# Patient Record
Sex: FEMALE | Race: WHITE | NOT HISPANIC OR LATINO | Employment: OTHER | URBAN - METROPOLITAN AREA
[De-identification: names, ages, dates, MRNs, and addresses within clinical notes are randomized per-mention and may not be internally consistent; named-entity substitution may affect disease eponyms.]

---

## 2023-11-01 ENCOUNTER — LAB REQUISITION (OUTPATIENT)
Dept: LAB | Facility: HOSPITAL | Age: 73
End: 2023-11-01
Payer: MEDICARE

## 2023-11-01 DIAGNOSIS — R35.0 FREQUENCY OF MICTURITION: ICD-10-CM

## 2023-11-01 PROCEDURE — 87086 URINE CULTURE/COLONY COUNT: CPT

## 2023-11-02 ENCOUNTER — LAB REQUISITION (OUTPATIENT)
Dept: LAB | Facility: HOSPITAL | Age: 73
End: 2023-11-02
Payer: MEDICARE

## 2023-11-02 DIAGNOSIS — R35.0 FREQUENCY OF MICTURITION: ICD-10-CM

## 2023-11-03 ENCOUNTER — OFFICE VISIT (OUTPATIENT)
Dept: PRIMARY CARE | Facility: CLINIC | Age: 73
End: 2023-11-03
Payer: MEDICARE

## 2023-11-03 ENCOUNTER — LAB (OUTPATIENT)
Dept: LAB | Facility: LAB | Age: 73
End: 2023-11-03
Payer: MEDICARE

## 2023-11-03 VITALS
BODY MASS INDEX: 20.7 KG/M2 | SYSTOLIC BLOOD PRESSURE: 111 MMHG | DIASTOLIC BLOOD PRESSURE: 69 MMHG | HEART RATE: 74 BPM | WEIGHT: 109.6 LBS

## 2023-11-03 DIAGNOSIS — R39.15 URINARY URGENCY: ICD-10-CM

## 2023-11-03 DIAGNOSIS — R39.15 URINARY URGENCY: Primary | ICD-10-CM

## 2023-11-03 PROBLEM — E04.1 THYROID NODULE: Status: ACTIVE | Noted: 2023-11-03

## 2023-11-03 PROBLEM — M81.0 AGE-RELATED OSTEOPOROSIS WITHOUT CURRENT PATHOLOGICAL FRACTURE: Status: ACTIVE | Noted: 2021-01-07

## 2023-11-03 PROBLEM — N60.11 BILATERAL FIBROCYSTIC BREAST CHANGES: Status: ACTIVE | Noted: 2018-10-23

## 2023-11-03 PROBLEM — J45.909 HYPERACTIVE AIRWAY DISEASE (HHS-HCC): Status: ACTIVE | Noted: 2023-11-03

## 2023-11-03 PROBLEM — L57.0 ACTINIC KERATOSIS: Status: RESOLVED | Noted: 2018-03-20 | Resolved: 2023-11-03

## 2023-11-03 PROBLEM — R04.0 EPISTAXIS: Status: RESOLVED | Noted: 2023-11-03 | Resolved: 2023-11-03

## 2023-11-03 PROBLEM — B07.9 VIRAL WART, UNSPECIFIED: Status: RESOLVED | Noted: 2018-03-20 | Resolved: 2023-11-03

## 2023-11-03 PROBLEM — N60.12 BILATERAL FIBROCYSTIC BREAST CHANGES: Status: ACTIVE | Noted: 2018-10-23

## 2023-11-03 PROBLEM — E78.5 BORDERLINE HYPERLIPIDEMIA: Status: ACTIVE | Noted: 2023-11-03

## 2023-11-03 PROBLEM — Z85.3 PERSONAL HISTORY OF BREAST CANCER: Status: RESOLVED | Noted: 2018-10-23 | Resolved: 2023-11-03

## 2023-11-03 PROBLEM — M81.0 OSTEOPOROSIS: Status: ACTIVE | Noted: 2023-11-03

## 2023-11-03 PROBLEM — Z17.0: Status: ACTIVE | Noted: 2023-11-03

## 2023-11-03 PROBLEM — C50.211: Status: ACTIVE | Noted: 2023-11-03

## 2023-11-03 PROBLEM — E55.9 VITAMIN D DEFICIENCY: Status: ACTIVE | Noted: 2021-01-07

## 2023-11-03 PROBLEM — L82.1 OTHER SEBORRHEIC KERATOSIS: Status: RESOLVED | Noted: 2018-03-20 | Resolved: 2023-11-03

## 2023-11-03 LAB
APPEARANCE UR: CLEAR
BACTERIA UR CULT: NO GROWTH
BILIRUB UR STRIP.AUTO-MCNC: NEGATIVE MG/DL
COLOR UR: COLORLESS
GLUCOSE UR STRIP.AUTO-MCNC: NEGATIVE MG/DL
KETONES UR STRIP.AUTO-MCNC: NEGATIVE MG/DL
LEUKOCYTE ESTERASE UR QL STRIP.AUTO: NEGATIVE
NITRITE UR QL STRIP.AUTO: NEGATIVE
PH UR STRIP.AUTO: 8 [PH]
PROT UR STRIP.AUTO-MCNC: NEGATIVE MG/DL
RBC # UR STRIP.AUTO: NEGATIVE /UL
SP GR UR STRIP.AUTO: 1
UROBILINOGEN UR STRIP.AUTO-MCNC: <2 MG/DL

## 2023-11-03 PROCEDURE — 1159F MED LIST DOCD IN RCRD: CPT | Performed by: INTERNAL MEDICINE

## 2023-11-03 PROCEDURE — 1160F RVW MEDS BY RX/DR IN RCRD: CPT | Performed by: INTERNAL MEDICINE

## 2023-11-03 PROCEDURE — 1036F TOBACCO NON-USER: CPT | Performed by: INTERNAL MEDICINE

## 2023-11-03 PROCEDURE — 81003 URINALYSIS AUTO W/O SCOPE: CPT

## 2023-11-03 PROCEDURE — 87086 URINE CULTURE/COLONY COUNT: CPT

## 2023-11-03 PROCEDURE — 99213 OFFICE O/P EST LOW 20 MIN: CPT | Performed by: INTERNAL MEDICINE

## 2023-11-03 RX ORDER — GINGER ROOT
POWDER (GRAM) MISCELLANEOUS
COMMUNITY
Start: 2017-12-26

## 2023-11-03 RX ORDER — NITROFURANTOIN 25; 75 MG/1; MG/1
CAPSULE ORAL
COMMUNITY

## 2023-11-03 RX ORDER — TURMERIC 100 %
POWDER (GRAM) MISCELLANEOUS
COMMUNITY
Start: 2017-12-26

## 2023-11-03 RX ORDER — MULTIVITAMIN WITH IRON
TABLET ORAL
COMMUNITY
Start: 2017-12-26

## 2023-11-03 ASSESSMENT — ENCOUNTER SYMPTOMS
DYSURIA: 0
HEADACHES: 0
DIZZINESS: 0
CHILLS: 0
DIFFICULTY URINATING: 0
BACK PAIN: 1
FREQUENCY: 1
ACTIVITY CHANGE: 0
FEVER: 0
FLANK PAIN: 0

## 2023-11-03 NOTE — PATIENT INSTRUCTIONS
I think it reasonable to repeat a urinalysis at this time, and we will follow-up on results.  Please finish your course of antibiotics as prescribed.  If you have any worsening symptoms, please contact us.  I would encourage you to come in for your routine annual wellness visit at your convenience.

## 2023-11-03 NOTE — PROGRESS NOTES
"Milvia Reyes comes in today for a possible UTI.      Ms. Reyes comes in today with a possible UTI.  She was last seen in our office 18 months ago, last time with me was 5 years ago.  It appears that she actually went to an urgent treatment center 2 days ago and had urine studies done with a culture still pending.  She was given nitrofurantoin at that time (per the chart this is nitrofurantoin, patient states Keflex).  She states that symptoms started on Tuesday.  She did not have any classic symptoms of a UTI other than overflow.  She had some generalized mild low back pain on the left.  On Wednesday she went to an urgent care and had a urine culture done with a urine dip with borderline results.  Again she was treated with antibiotics.  She now has a mild urgency, denies specifically back pain but states that this \"does not quite feel normal.\"  She has no fevers, no nausea, no abdominal pain, just a sensation of urgency.  She is continuing on her antibiotics currently.        Review of Systems   Constitutional:  Negative for activity change, chills and fever.   Genitourinary:  Positive for frequency and urgency. Negative for difficulty urinating, dysuria and flank pain (\"Does not feel normal\" but not true pain).   Musculoskeletal:  Positive for back pain (As above).   Neurological:  Negative for dizziness and headaches.       Objective   Physical Exam  Constitutional:       General: She is not in acute distress.     Appearance: Normal appearance. She is not diaphoretic.   Cardiovascular:      Rate and Rhythm: Normal rate.   Pulmonary:      Effort: Pulmonary effort is normal. No respiratory distress.   Abdominal:      General: There is no distension.      Tenderness: There is no abdominal tenderness. There is no right CVA tenderness, left CVA tenderness, guarding or rebound.   Neurological:      Mental Status: She is alert.         Assessment/Plan   1.  Urinary urgency: She is already on antibiotics.  " Symptoms are seemingly slightly improved.  Reasonable to repeat urinalysis as we do not have evidence of this in our chart, and I will repeat a urine culture, as still have no results from 1 done 2 days ago, need to ensure that this has not mistakenly not been run.  She will specimen with us today.  Again, she is covered currently with antibiotics and we will follow-up on results once available.  She is to call with any acute worsening of symptoms.  Problem List Items Addressed This Visit    None

## 2023-11-05 LAB — BACTERIA UR CULT: NO GROWTH

## 2024-01-26 ENCOUNTER — LAB REQUISITION (OUTPATIENT)
Dept: LAB | Facility: HOSPITAL | Age: 74
End: 2024-01-26
Payer: MEDICARE

## 2024-01-26 DIAGNOSIS — R30.0 DYSURIA: ICD-10-CM

## 2024-01-26 PROCEDURE — 87086 URINE CULTURE/COLONY COUNT: CPT

## 2024-01-28 LAB — BACTERIA UR CULT: NORMAL

## 2024-08-21 ENCOUNTER — LAB REQUISITION (OUTPATIENT)
Dept: LAB | Facility: HOSPITAL | Age: 74
End: 2024-08-21
Payer: MEDICARE

## 2024-08-21 DIAGNOSIS — R35.0 FREQUENCY OF MICTURITION: ICD-10-CM

## 2024-08-21 PROCEDURE — 87086 URINE CULTURE/COLONY COUNT: CPT

## 2024-08-23 LAB — BACTERIA UR CULT: NO GROWTH

## 2024-08-27 ENCOUNTER — APPOINTMENT (OUTPATIENT)
Dept: PRIMARY CARE | Facility: CLINIC | Age: 74
End: 2024-08-27
Payer: MEDICARE

## 2024-10-31 ENCOUNTER — APPOINTMENT (OUTPATIENT)
Dept: PRIMARY CARE | Facility: CLINIC | Age: 74
End: 2024-10-31
Payer: MEDICARE